# Patient Record
Sex: FEMALE | Race: WHITE
[De-identification: names, ages, dates, MRNs, and addresses within clinical notes are randomized per-mention and may not be internally consistent; named-entity substitution may affect disease eponyms.]

---

## 2018-11-09 ENCOUNTER — HOSPITAL ENCOUNTER (EMERGENCY)
Dept: HOSPITAL 38 - CC.ED | Age: 78
Discharge: HOME | End: 2018-11-09
Payer: MEDICARE

## 2018-11-09 VITALS — DIASTOLIC BLOOD PRESSURE: 74 MMHG | SYSTOLIC BLOOD PRESSURE: 138 MMHG

## 2018-11-09 DIAGNOSIS — Z88.0: ICD-10-CM

## 2018-11-09 DIAGNOSIS — S01.412A: Primary | ICD-10-CM

## 2018-11-09 DIAGNOSIS — Z79.01: ICD-10-CM

## 2018-11-09 DIAGNOSIS — Z79.82: ICD-10-CM

## 2018-11-09 DIAGNOSIS — Z79.899: ICD-10-CM

## 2018-11-09 NOTE — EDM.PDOC
ED HPI GENERAL MEDICAL PROBLEM





- General


Chief Complaint: Laceration


Stated Complaint: scraped face


Time Seen by Provider: 11/09/18 19:20


Source of Information: Reports: Patient


History Limitations: Reports: No Limitations





- History of Present Illness


INITIAL COMMENTS - FREE TEXT/NARRATIVE: 





Patient presents to ER with abrasions/lacerations to face s/p fall.  She 

relates that she does fall often due to her MS.  Tonight, tripped over a foot 

exerciser and hit her face on the floor.  Did not lose consciousness.  Has not 

pain.  She denies epistaxis.  No pain elsewhere.  Good range of motion per her 

norm to all extremities.


Onset: Today, Sudden


Duration: Minutes:


Location: Reports: Face


Quality: Reports: Ache


Severity: Mild


Associated Symptoms: Denies: Confusion


Treatments PTA: Reports: Dressing(s)





- Related Data


 Allergies











Allergy/AdvReac Type Severity Reaction Status Date / Time


 


Penicillins Allergy  Rash Verified 11/09/18 18:53











Home Meds: 


 Home Meds





Amiodarone [Cordarone] 200 mg PO DAILY 11/09/18 [History]


Aspirin [Halfprin] 81 mg PO DAILY 11/09/18 [History]


Cholecalciferol (Vitamin D3) [Vitamin D3] 2,000 units PO DAILY 11/09/18 [History

]


Furosemide 20 mg PO DAILY 11/09/18 [History]


Lisinopril 5 mg PO DAILY 11/09/18 [History]


Metoprolol Succinate 100 mg PO DAILY 11/09/18 [History]


Pantoprazole [ProTONIX***] 40 mg PO DAILY 11/09/18 [History]


Potassium Chloride 5 meq PO DAILY 11/09/18 [History]


Simvastatin 20 mg PO DAILY 11/09/18 [History]


Warfarin [Coumadin] 2.5 mg PO DAILY 11/09/18 [History]


amLODIPine [Norvasc] 2.5 mg PO DAILY 11/09/18 [History]











Past Medical History


HEENT History: Reports: Cataract


Cardiovascular History: Reports: Afib, Automatic Implantable Cardioverter 

Defibrillators, Heart Valve Replacement, Hypertension, Pacemaker


Gastrointestinal History: Reports: GERD


Genitourinary History: Reports: Chronic Renal Insuffiency


Musculoskeletal History: Reports: Arthritis, Other (See Below)


Other Musculoskeletal History: chronic monik shoulder pain/stiffness


Neurological History: Reports: MS, Other (See Below)


Other Neuro History: history of falls





- Past Surgical History


HEENT Surgical History: Reports: Cataract Surgery


Cardiovascular Surgical History: Reports: AICD, Valve Replacement





Social & Family History





- Family History


Family Medical History: Noncontributory





- Tobacco Use


Smoking Status *Q: Never Smoker





- Caffeine Use


Caffeine Use: Reports: Coffee





- Recreational Drug Use


Recreational Drug Use: No





ED ROS GENERAL





- Review of Systems


Review Of Systems: See Below


Constitutional: Reports: Weakness (leg weakness due to MS)


HEENT: Denies: Ear Discharge, Ear Pain, Nosebleed, Rhinitis, Vertigo


Respiratory: Denies: No Symptoms


Cardiovascular: Denies: No Symptoms


Skin: Reports: Wound


Neurological: Denies: Headache, Syncope


Psychiatric: Reports: No Symptoms





ED EXAM, SKIN/RASH


Exam: See Below


Exam Limited By: No Limitations


General Appearance: Alert, WD/WN, No Apparent Distress


Ears: Normal External Exam, Normal TMs


Nose: Normal Inspection, Normal Mucosa, No Blood


Throat/Mouth: Normal Inspection, Normal Oropharynx


Head: Normocephalic, Facial Tenderness (abrasion/bruising to left cheek.  

Linear superficial lacerations to left cheek.  small bruise on right cheek)





ED SKIN PROCEDURES





- Laceration/Wound Repair


  ** Left Cheek


Lac/Wound length In cm: 2


Appearance: Superficial, Linear, Irregular


Skin Prep: Other (paula-clens)


Closed with: Dermabond, Steri-Strips


Tetanus Status Addressed: Yes


Complications: Yes





Course





- Vital Signs


Last Recorded V/S: 


 Last Vital Signs











Temp  96.5 F   11/09/18 18:47


 


Pulse  77   11/09/18 18:47


 


Resp  18   11/09/18 18:47


 


BP  138/74   11/09/18 18:47


 


Pulse Ox  100   11/09/18 18:47














Departure





- Departure


Time of Disposition: 19:27


Disposition: Home, Self-Care 01


Condition: Good


Clinical Impression: 


 Broken skin








- Discharge Information


*PRESCRIPTION DRUG MONITORING PROGRAM REVIEWED*: No


*COPY OF PRESCRIPTION DRUG MONITORING REPORT IN PATIENT HUA: No


Referrals: 


PCP,None [Primary Care Provider] - 


Forms:  ED Department Discharge


Additional Instructions: 


1.  Keep wound clean and dry


2.  Cover as needed


3.  Allow steri strips to fall off


4.  Notify us of any concerns.

## 2020-04-17 NOTE — EDM.PDOC
ED HPI GENERAL MEDICAL PROBLEM





- General


Stated Complaint: FELL


Time Seen by Provider: 04/17/20 15:15


Source of Information: Reports: Patient, EMS, Family ()


History Limitations: Reports: No Limitations





- History of Present Illness


INITIAL COMMENTS - FREE TEXT/NARRATIVE: 





This patient is an 79 year old female that presents to the ER via EMS.  

is in room as historian as well. Patient is alert and oriented. Patient reports 

that she was walking to the bathroom and felt generally weak and dizzy, then 

does not remember after that.  reports that he was in other room and 

heard her fall. He reports walking into room and finding her on the floor 

unconscious, but was breathing. He called 911.  reports that the patient 

was not responding for about 5 minutes. Patient arrives to the ER via EMS. 

Patient is alert and oriented. Patient has hx of MS. She denies unilateral 

weaknesses. Patient reports global headache and nausea. Trauma code was called. 

Fall on Coumadin. Patient taken to CT for head and cervical, portable CXR. 

Patient undressed for exam, 2 IVs started, Cardiac monitor, C-Collar applied. 


Onset: Today


Onset Date: 04/17/20


Onset Time: 14:30


Front/Back Body Image: 


  __________________________














  __________________________





 1 - laceration





 2 - hematoma





Severity: Moderate


Improves with: Reports: None


Worsens with: Reports: None


Associated Symptoms: Reports: No Other Symptoms, Headaches, Syncope, Weakness (

generally).  Denies: Confusion, Chest Pain, Cough, cough w sputum, Diaphoresis, 

Fever/Chills, Loss of Appetite, Malaise, Nausea/Vomiting, Rash, Seizure, 

Shortness of Breath





- Related Data


 Allergies











Allergy/AdvReac Type Severity Reaction Status Date / Time


 


Penicillins Allergy  Rash Verified 11/09/18 18:53











Home Meds: 


 Home Meds





Amiodarone [Cordarone] 200 mg PO DAILY 11/09/18 [History]


Aspirin [Halfprin] 81 mg PO DAILY 11/09/18 [History]


Cholecalciferol (Vitamin D3) [Vitamin D3] 2,000 units PO DAILY 11/09/18 [History

]


Furosemide 20 mg PO DAILY 11/09/18 [History]


Lisinopril 5 mg PO DAILY 11/09/18 [History]


Metoprolol Succinate 100 mg PO DAILY 11/09/18 [History]


Pantoprazole [ProTONIX***] 40 mg PO DAILY 11/09/18 [History]


Potassium Chloride 5 meq PO DAILY 11/09/18 [History]


Simvastatin 20 mg PO DAILY 11/09/18 [History]


Warfarin [Coumadin] 2.5 mg PO DAILY 11/09/18 [History]


amLODIPine [Norvasc] 2.5 mg PO DAILY 11/09/18 [History]











Past Medical History


HEENT History: Reports: Cataract


Cardiovascular History: Reports: Afib, Automatic Implantable Cardioverter 

Defibrillators, Heart Valve Replacement, Hypertension, Pacemaker


Gastrointestinal History: Reports: GERD


Genitourinary History: Reports: Chronic Renal Insuffiency


Musculoskeletal History: Reports: Arthritis, Other (See Below)


Other Musculoskeletal History: chronic monik shoulder pain/stiffness


Neurological History: Reports: MS, Other (See Below)


Other Neuro History: history of falls





- Past Surgical History


HEENT Surgical History: Reports: Cataract Surgery


Cardiovascular Surgical History: Reports: AICD, Valve Replacement





Social & Family History





- Family History


Family Medical History: Noncontributory





- Caffeine Use


Caffeine Use: Reports: Coffee





ED ROS GENERAL





- Review of Systems


Review Of Systems: See Below


Constitutional: Reports: Weakness (generalized)


HEENT: Reports: No Symptoms


Respiratory: Reports: No Symptoms.  Denies: Shortness of Breath


Cardiovascular: Reports: Syncope.  Denies: Chest Pain, Dyspnea on Exertion, 

Edema, Palpitations


Endocrine: Reports: No Symptoms


GI/Abdominal: Reports: Nausea.  Denies: Abdominal Pain, Diarrhea, Vomiting


: Reports: No Symptoms


Musculoskeletal: Reports: No Symptoms.  Denies: Neck Pain, Shoulder Pain, Arm 

Pain, Back Pain, Hand Pain, Leg Pain, Foot Pain, Joint Pain, Muscle Pain, 

Muscle Stiffness


Skin: Reports: Wound


Neurological: Reports: Headache, Syncope.  Denies: Trouble Speaking, Change in 

Speech


Psychiatric: Reports: No Symptoms


Hematologic/Lymphatic: Reports: No Symptoms


Immunologic: Reports: No Symptoms





- Physical Exam


Exam: See Below


Exam Limited By: No Limitations


General Appearance: Alert, WD/WN


Eye Exam: Bilateral Eye: EOMI, Normal Inspection, PERRL


Ears: Normal External Exam, Normal Canal, Hearing Grossly Normal, Normal TMs


Nose: Normal Inspection, Normal Mucosa, No Blood


Throat/Mouth: Normal Inspection, Normal Lips, Normal Gums, Normal Oropharynx, 

Normal Voice, No Airway Compromise


Head Exam: Scalp Ecchymosis (left frontal), Scalp Hematoma (left frontal), 

Other (right pariatal laceration 2cm)


Neck: Normal Inspection, Supple, Non-Tender, Full Range of Motion, Other (C-

COLLAR Placed)


Respiratory/Chest: No Respiratory Distress, Lungs Clear, Normal Breath Sounds, 

No Accessory Muscle Use, Chest Non-Tender


Cardiovascular: Normal Peripheral Pulses, Regular Rate, Rhythm, Systolic Murmur


GI/Abdominal: Soft, Non-Tender


Neuro Exam (Abbreviated): Alert, Oriented, CN II-XII Intact, Normal Cognition, 

Other (Stroke Score 0: GCS: 15)


Back Exam: Normal Inspection, Full Range of Motion


Extremities: Normal Inspection, Normal Range of Motion, Non-Tender, Normal 

Capillary Refill


Psychiatric: Normal Affect, Normal Mood


Skin Exam: Warm, Dry, Ecchymosis (Left frontal scalp with hematoma), Wound/

Incision (Right pariatal 2cm Laceration: NOT SUTURED, EMERGENT FLIGHT.)





Course





- Orders/Labs/Meds


Orders: 


 Active Orders 24 hr











 Category Date Time Status


 


 Cervical Spine wo Cont [CT] Stat Exams  04/17/20 15:16 Taken


 


 Chest 1V Frontal [CR] Stat Exams  04/17/20 15:16 Taken


 


 Head wo Cont [CT] Stat Exams  04/17/20 15:16 Taken


 


 ABO/RH TYPE [BBK] Stat Lab  04/17/20 15:59 Results


 


 CULTURE BLOOD [BC] Stat Lab  04/17/20 15:40 Received


 


 CULTURE BLOOD [BC] Stat Lab  04/17/20 15:45 Received


 


 FRESH FROZEN PLASMA [BBK] Stat Lab  04/17/20 15:59 Results


 


 UA W/MICROSCOPIC [URIN] Stat Lab  04/17/20 15:16 Ordered


 


 Blood Culture x2 Reflex Set [OM.PC] Stat Oth  04/17/20 15:17 Ordered


 


 Transfuse Fresh Frozen Plasma [COMM] Stat Oth  04/17/20 15:52 Ordered











Labs: 


 Laboratory Tests











  04/17/20 04/17/20 04/17/20 Range/Units





  15:40 15:40 15:40 


 


WBC  7.7    (5.0-10.0)  10^3/uL


 


RBC  4.60    (4.00-5.50)  10^6/uL


 


Hgb  13.7    (12.0-16.0)  g/dL


 


Hct  41.0    (37.0-47.0)  %


 


MCV  89.1    (82.0-94.0)  fL


 


MCH  29.8    (27.0-32.0)  pg


 


MCHC  33.4    (33.0-38.0)  g/dL


 


RDW Coeff of Marisela  14.1    (11.0-15.0)  %


 


Plt Count  202    (150-400)  10^3/uL


 


Neut % (Auto)  70.0    (35-85)  %


 


Lymph % (Auto)  19.5    (10-55)  %


 


Mono % (Auto)  8.5    (0-16)  %


 


Eos % (Auto)  1.6    (0-5)  %


 


Baso % (Auto)  0.4    (0-3)  %


 


Neut # (Auto)  5.39    (1.80-7.00)  10^3/uL


 


Lymph # (Auto)  1.50    (1.00-4.80)  10^3/uL


 


Mono # (Auto)  0.65    (0.00-0.80)  10^3/uL


 


Eos # (Auto)  0.12    (0.00-0.45)  10^3/uL


 


Baso # (Auto)  0.03    10^3/uL


 


PT   18.8 H   (9.7-12.3)  SEC


 


INR   1.87 H   (0.92-1.18)  


 


Sodium    138  (136-145)  mEq/L


 


Potassium    3.9  (3.5-5.0)  mEq/L


 


Chloride    103  ()  mEq/L


 


Carbon Dioxide    27  (21-32)  mmol/L


 


BUN    41 H  (7-18)  mg/dL


 


Creatinine    2.1 H  (0.6-1.0)  mg/dL


 


Est Cr Clr Drug Dosing    TNP  


 


Estimated GFR (MDRD)    23 L  (>=60)  mL/min


 


Glucose    121 H  (75-99)  mg/dL


 


Lactic Acid     (0.4-2.0)  mmol/L


 


Calcium    8.7  (8.4-10.1)  mg/dL


 


Total Bilirubin    0.7  (0.0-1.0)  mg/dL


 


AST    32  (15-37)  U/L


 


ALT    27  (12-78)  U/L


 


Alkaline Phosphatase    79  ()  U/L


 


Lactate Dehydrogenase    196 H  (100-190)  U/L


 


Troponin I    < 0.017  (0.00-0.06)  ng/mL


 


NT-Pro-B Natriuret Pep    973  (0-1000)  pg/mL


 


Total Protein    7.0  (6.4-8.2)  g/dL


 


Albumin    3.4  (3.4-5.0)  g/dL


 


Blood Type     














  04/17/20 04/17/20 Range/Units





  15:40 15:59 


 


WBC    (5.0-10.0)  10^3/uL


 


RBC    (4.00-5.50)  10^6/uL


 


Hgb    (12.0-16.0)  g/dL


 


Hct    (37.0-47.0)  %


 


MCV    (82.0-94.0)  fL


 


MCH    (27.0-32.0)  pg


 


MCHC    (33.0-38.0)  g/dL


 


RDW Coeff of Marisela    (11.0-15.0)  %


 


Plt Count    (150-400)  10^3/uL


 


Neut % (Auto)    (35-85)  %


 


Lymph % (Auto)    (10-55)  %


 


Mono % (Auto)    (0-16)  %


 


Eos % (Auto)    (0-5)  %


 


Baso % (Auto)    (0-3)  %


 


Neut # (Auto)    (1.80-7.00)  10^3/uL


 


Lymph # (Auto)    (1.00-4.80)  10^3/uL


 


Mono # (Auto)    (0.00-0.80)  10^3/uL


 


Eos # (Auto)    (0.00-0.45)  10^3/uL


 


Baso # (Auto)    10^3/uL


 


PT    (9.7-12.3)  SEC


 


INR    (0.92-1.18)  


 


Sodium    (136-145)  mEq/L


 


Potassium    (3.5-5.0)  mEq/L


 


Chloride    ()  mEq/L


 


Carbon Dioxide    (21-32)  mmol/L


 


BUN    (7-18)  mg/dL


 


Creatinine    (0.6-1.0)  mg/dL


 


Est Cr Clr Drug Dosing    


 


Estimated GFR (MDRD)    (>=60)  mL/min


 


Glucose    (75-99)  mg/dL


 


Lactic Acid  1.0   (0.4-2.0)  mmol/L


 


Calcium    (8.4-10.1)  mg/dL


 


Total Bilirubin    (0.0-1.0)  mg/dL


 


AST    (15-37)  U/L


 


ALT    (12-78)  U/L


 


Alkaline Phosphatase    ()  U/L


 


Lactate Dehydrogenase    (100-190)  U/L


 


Troponin I    (0.00-0.06)  ng/mL


 


NT-Pro-B Natriuret Pep    (0-1000)  pg/mL


 


Total Protein    (6.4-8.2)  g/dL


 


Albumin    (3.4-5.0)  g/dL


 


Blood Type   A POSITIVE  











Meds: 


Medications














Discontinued Medications














Generic Name Dose Route Start Last Admin





  Trade Name Mazin  PRN Reason Stop Dose Admin


 


Sodium Chloride  Confirm  04/17/20 16:01  





  Normal Saline  Administered  04/17/20 16:02  





  Dose   





  250 mls @ as directed   





  .ROUTE   





  .STK-MED ONE   





     





     





     





     


 


Sodium Chloride  Confirm  04/17/20 16:08  





  Normal Saline  Administered  04/17/20 16:09  





  Dose   





  250 mls @ as directed   





  .ROUTE   





  .STK-MED ONE   





     





     





     





     


 


Ondansetron HCl  4 mg  04/17/20 15:30  04/17/20 15:33





  Zofran  IVPUSH  04/17/20 15:31  4 mg





  NOW STA   Administration





     





     





     





     














- Radiology Interpretation


Free Text/Narrative:: 





Head ct without: Bleed without shift. no skull fracture


CT Results Date: 04/17/20


CT Results Time: 16:09





- Re-Assessments/Exams


Free Text/Narrative Re-Assessment/Exam: 





04/17/20 1527


I reviewed head ct: I believe patient has a head bleed. I called [facility] 

to page flight.





04/17/20 15:40


I called and spoke to Dr. Judson ANDERSON at [facility] for accepting. She has 

accepted the patient. She would like to give KCENTRA, However, we do not have 

that here. So, I ordered 2 FFP. Still waiting on INR. She had neurosurgery 

paged. 





04/17/20 16:00


Neurosurgery Dr. Loya called. Spoke to him and gave him report. He reports 

if INR is 5 or above to give the KCENTRA and 4 FFP. If less, give 2 FFP. Give 

FFP hung prior to leaving here. INR still not available. 





04/17/20 16:07


INR available, give 2 FFP. Flight near here. Patient remains alert and 

oriented. Her 02 on RA is now 88%, patient head of bed elevated to 45 degrees, 

patient repositioned, 2L NC placed, patient now 94%. 





04/17/20 16:25


Flight arrived. Patient alert and oriented. 2 FFP going now. Transferred. TDAP 

UTD. No time to staple head. 











Departure





- Departure


Time of Disposition: 16:25


Disposition: DC/Tfer to Acute Hospital 02


Condition: Critical


Clinical Impression: 


 Subarachnoid bleed








- Discharge Information


*PRESCRIPTION DRUG MONITORING PROGRAM REVIEWED*: Not Applicable


*COPY OF PRESCRIPTION DRUG MONITORING REPORT IN PATIENT HUA: Not Applicable


Referrals: 


PCP,Unknown [Primary Care Provider] - 





Sepsis Event Note





- Focused Exam


Date Exam was Performed: 04/17/20


Time Exam was Performed: 16:41





- My Orders


Last 24 Hours: 


My Active Orders





04/17/20 15:16


Cervical Spine wo Cont [CT] Stat 


Chest 1V Frontal [CR] Stat 


Head wo Cont [CT] Stat 


UA W/MICROSCOPIC [URIN] Stat 





04/17/20 15:17


Blood Culture x2 Reflex Set [OM.PC] Stat 





04/17/20 15:40


CULTURE BLOOD [BC] Stat 





04/17/20 15:45


CULTURE BLOOD [BC] Stat 





04/17/20 15:52


Transfuse Fresh Frozen Plasma [COMM] Stat 





04/17/20 15:59


ABO/RH TYPE [BBK] Stat 


FRESH FROZEN PLASMA [BBK] Stat 














- Assessment/Plan


Last 24 Hours: 


My Active Orders





04/17/20 15:16


Cervical Spine wo Cont [CT] Stat 


Chest 1V Frontal [CR] Stat 


Head wo Cont [CT] Stat 


UA W/MICROSCOPIC [URIN] Stat 





04/17/20 15:17


Blood Culture x2 Reflex Set [OM.PC] Stat 





04/17/20 15:40


CULTURE BLOOD [BC] Stat 





04/17/20 15:45


CULTURE BLOOD [BC] Stat 





04/17/20 15:52


Transfuse Fresh Frozen Plasma [COMM] Stat 





04/17/20 15:59


ABO/RH TYPE [BBK] Stat 


FRESH FROZEN PLASMA [BBK] Stat 











Plan: 





PLEASE SEE RN NOTE FOR PFSH. 


Patient is being transferred to [facility] via flight. Risk vs benefits 

explained to  and patient. Risk is helicopter crash, death, worsening of 

bleed, worsening of condition. The risk of staying in [place] is worsening 

of condition, death, no neurosurgeon. The benefits of transfer is trauma center

, neurosurgeon, ICU. The benefits of staying in [place] is close to home.